# Patient Record
Sex: MALE | Race: WHITE | NOT HISPANIC OR LATINO | URBAN - METROPOLITAN AREA
[De-identification: names, ages, dates, MRNs, and addresses within clinical notes are randomized per-mention and may not be internally consistent; named-entity substitution may affect disease eponyms.]

---

## 2018-01-30 ENCOUNTER — APPOINTMENT (RX ONLY)
Dept: URBAN - METROPOLITAN AREA CLINIC 349 | Facility: CLINIC | Age: 28
Setting detail: DERMATOLOGY
End: 2018-01-30

## 2018-01-30 DIAGNOSIS — B36.0 PITYRIASIS VERSICOLOR: ICD-10-CM

## 2018-01-30 PROCEDURE — ? COUNSELING

## 2018-01-30 PROCEDURE — ? PRESCRIPTION

## 2018-01-30 PROCEDURE — 99202 OFFICE O/P NEW SF 15 MIN: CPT

## 2018-01-30 RX ORDER — KETOCONAZOLE 20.5 MG/ML
SHAMPOO, SUSPENSION TOPICAL
Qty: 1 | Refills: 5 | Status: ERX | COMMUNITY
Start: 2018-01-30

## 2018-01-30 RX ORDER — KETOCONAZOLE 200 MG/1
TABLET ORAL
Qty: 8 | Refills: 0 | Status: ERX | COMMUNITY
Start: 2018-01-30

## 2018-01-30 RX ADMIN — KETOCONAZOLE: 200 TABLET ORAL at 00:00

## 2018-01-30 RX ADMIN — KETOCONAZOLE: 20.5 SHAMPOO, SUSPENSION TOPICAL at 00:00

## 2018-01-30 ASSESSMENT — LOCATION ZONE DERM
LOCATION ZONE: TRUNK
LOCATION ZONE: ARM

## 2018-01-30 ASSESSMENT — LOCATION SIMPLE DESCRIPTION DERM
LOCATION SIMPLE: RIGHT UPPER ARM
LOCATION SIMPLE: RIGHT UPPER BACK
LOCATION SIMPLE: LEFT UPPER BACK
LOCATION SIMPLE: CHEST
LOCATION SIMPLE: LEFT FOREARM
LOCATION SIMPLE: LEFT UPPER ARM
LOCATION SIMPLE: RIGHT FOREARM

## 2018-01-30 ASSESSMENT — LOCATION DETAILED DESCRIPTION DERM
LOCATION DETAILED: LEFT SUPERIOR LATERAL UPPER BACK
LOCATION DETAILED: LEFT ANTERIOR PROXIMAL UPPER ARM
LOCATION DETAILED: RIGHT SUPERIOR UPPER BACK
LOCATION DETAILED: LEFT LATERAL UPPER BACK
LOCATION DETAILED: RIGHT VENTRAL PROXIMAL FOREARM
LOCATION DETAILED: LEFT MEDIAL SUPERIOR CHEST
LOCATION DETAILED: LEFT INFERIOR UPPER BACK
LOCATION DETAILED: LEFT VENTRAL PROXIMAL FOREARM
LOCATION DETAILED: RIGHT ANTERIOR PROXIMAL UPPER ARM

## 2022-11-16 ENCOUNTER — APPOINTMENT (RX ONLY)
Dept: URBAN - METROPOLITAN AREA CLINIC 330 | Facility: CLINIC | Age: 32
Setting detail: DERMATOLOGY
End: 2022-11-16

## 2022-11-16 DIAGNOSIS — Z80.8 FAMILY HISTORY OF MALIGNANT NEOPLASM OF OTHER ORGANS OR SYSTEMS: ICD-10-CM

## 2022-11-16 DIAGNOSIS — L82.1 OTHER SEBORRHEIC KERATOSIS: ICD-10-CM

## 2022-11-16 DIAGNOSIS — B36.0 PITYRIASIS VERSICOLOR: ICD-10-CM | Status: WELL CONTROLLED

## 2022-11-16 PROCEDURE — ? PRESCRIPTION MEDICATION MANAGEMENT

## 2022-11-16 PROCEDURE — ? COUNSELING

## 2022-11-16 PROCEDURE — 99203 OFFICE O/P NEW LOW 30 MIN: CPT

## 2022-11-16 PROCEDURE — ? PRESCRIPTION

## 2022-11-16 RX ORDER — KETOCONAZOLE 20 MG/G
CREAM TOPICAL
Qty: 60 | Refills: 4 | Status: ERX | COMMUNITY
Start: 2022-11-16

## 2022-11-16 RX ORDER — KETOCONAZOLE 20 MG/ML
SHAMPOO, SUSPENSION TOPICAL
Qty: 120 | Refills: 4 | Status: ERX | COMMUNITY
Start: 2022-11-16

## 2022-11-16 RX ADMIN — KETOCONAZOLE: 20 SHAMPOO, SUSPENSION TOPICAL at 00:00

## 2022-11-16 RX ADMIN — KETOCONAZOLE: 20 CREAM TOPICAL at 00:00

## 2022-11-16 ASSESSMENT — LOCATION ZONE DERM
LOCATION ZONE: ARM
LOCATION ZONE: TRUNK
LOCATION ZONE: HAND

## 2022-11-16 ASSESSMENT — LOCATION DETAILED DESCRIPTION DERM
LOCATION DETAILED: LEFT VENTRAL PROXIMAL FOREARM
LOCATION DETAILED: LEFT INFERIOR UPPER BACK
LOCATION DETAILED: RIGHT VENTRAL PROXIMAL FOREARM
LOCATION DETAILED: LEFT SUPERIOR LATERAL UPPER BACK
LOCATION DETAILED: LEFT ULNAR DORSAL HAND
LOCATION DETAILED: RIGHT RADIAL DORSAL HAND
LOCATION DETAILED: LEFT LATERAL UPPER BACK
LOCATION DETAILED: LEFT ANTERIOR PROXIMAL UPPER ARM
LOCATION DETAILED: LEFT MEDIAL SUPERIOR CHEST
LOCATION DETAILED: RIGHT SUPERIOR UPPER BACK
LOCATION DETAILED: RIGHT ANTERIOR PROXIMAL UPPER ARM

## 2022-11-16 ASSESSMENT — LOCATION SIMPLE DESCRIPTION DERM
LOCATION SIMPLE: LEFT UPPER BACK
LOCATION SIMPLE: RIGHT UPPER ARM
LOCATION SIMPLE: LEFT FOREARM
LOCATION SIMPLE: RIGHT UPPER BACK
LOCATION SIMPLE: CHEST
LOCATION SIMPLE: LEFT UPPER ARM
LOCATION SIMPLE: LEFT HAND
LOCATION SIMPLE: RIGHT FOREARM
LOCATION SIMPLE: RIGHT HAND

## 2022-11-16 NOTE — PROCEDURE: PRESCRIPTION MEDICATION MANAGEMENT
Initiate Treatment: ketoconazole 2 % shampoo, Wash affected area on body once daily for two weeks when flared, then once weekly for maintenance\\n\\nketoconazole 2 % topical cream, Apply to affected areas on body bid x2 weeks then once weekly for maintenance.
Detail Level: Zone
Render In Strict Bullet Format?: No

## 2024-10-01 ENCOUNTER — OFFICE VISIT (OUTPATIENT)
Dept: ORTHOPEDIC SURGERY | Age: 34
End: 2024-10-01
Payer: COMMERCIAL

## 2024-10-01 VITALS — HEIGHT: 78 IN | WEIGHT: 215 LBS | BODY MASS INDEX: 24.88 KG/M2

## 2024-10-01 DIAGNOSIS — M25.511 RIGHT SHOULDER PAIN, UNSPECIFIED CHRONICITY: Primary | ICD-10-CM

## 2024-10-01 DIAGNOSIS — S43.431A SUPERIOR GLENOID LABRUM LESION OF RIGHT SHOULDER, INITIAL ENCOUNTER: ICD-10-CM

## 2024-10-01 DIAGNOSIS — M75.21 BICEPS TENDINITIS OF RIGHT SHOULDER: ICD-10-CM

## 2024-10-01 PROCEDURE — G8484 FLU IMMUNIZE NO ADMIN: HCPCS | Performed by: ORTHOPAEDIC SURGERY

## 2024-10-01 PROCEDURE — G8419 CALC BMI OUT NRM PARAM NOF/U: HCPCS | Performed by: ORTHOPAEDIC SURGERY

## 2024-10-01 PROCEDURE — 99204 OFFICE O/P NEW MOD 45 MIN: CPT | Performed by: ORTHOPAEDIC SURGERY

## 2024-10-01 PROCEDURE — G8427 DOCREV CUR MEDS BY ELIG CLIN: HCPCS | Performed by: ORTHOPAEDIC SURGERY

## 2024-10-01 PROCEDURE — 1036F TOBACCO NON-USER: CPT | Performed by: ORTHOPAEDIC SURGERY

## 2024-10-01 RX ORDER — MELOXICAM 7.5 MG/1
TABLET ORAL
Qty: 42 TABLET | Refills: 0 | Status: SHIPPED | OUTPATIENT
Start: 2024-10-01

## 2024-10-01 NOTE — PROGRESS NOTES
Plan: I had a long discussion with the patient regarding the natural history of the problem, as well as treatment options. I discussed with the patient treatment options including oral medication, injections, advanced imaging, physical therapy and ultimately surgical intervention.  At this time, the patient has decided with me to proceed with NSAIDs and physical therapy.  Discussed the possibility for diagnostic and therapeutic injection but I think at this point I would suggest just trying the NSAIDs and therapy.  If not improving may consider MRI.      Treatment:   Recommend therapy to evaluate and treat current complaints and pathology. A home exercise program was also discussed with the patient.  Patient prescribed with Non-steroidal anti-inflammatories after review of risks and benefits. We discussed additional activity modification to help reduce pain for initial conservative treatment.      Return in about 6 weeks (around 11/12/2024).    Thank you for the opportunity to see your patient.  If you have any questions please give me a call.  Sincerely,    González Chase MD  10/01/24

## 2024-10-10 ENCOUNTER — EVALUATION (OUTPATIENT)
Age: 34
End: 2024-10-10
Payer: COMMERCIAL

## 2024-10-10 DIAGNOSIS — S43.431A SUPERIOR GLENOID LABRUM LESION OF RIGHT SHOULDER, INITIAL ENCOUNTER: ICD-10-CM

## 2024-10-10 DIAGNOSIS — Z74.09 IMPAIRED FUNCTIONAL MOBILITY AND ACTIVITY TOLERANCE: ICD-10-CM

## 2024-10-10 DIAGNOSIS — M62.81 MUSCLE WEAKNESS: ICD-10-CM

## 2024-10-10 DIAGNOSIS — G89.29 CHRONIC RIGHT SHOULDER PAIN: Primary | ICD-10-CM

## 2024-10-10 DIAGNOSIS — M25.511 CHRONIC RIGHT SHOULDER PAIN: Primary | ICD-10-CM

## 2024-10-10 DIAGNOSIS — M25.511 RIGHT SHOULDER PAIN, UNSPECIFIED CHRONICITY: ICD-10-CM

## 2024-10-10 DIAGNOSIS — M75.21 BICEPS TENDINITIS OF RIGHT SHOULDER: ICD-10-CM

## 2024-10-10 PROCEDURE — 97110 THERAPEUTIC EXERCISES: CPT | Performed by: PHYSICAL THERAPIST

## 2024-10-10 PROCEDURE — 97161 PT EVAL LOW COMPLEX 20 MIN: CPT | Performed by: PHYSICAL THERAPIST

## 2024-10-10 NOTE — PROGRESS NOTES
[facility]  [address] [place] [id number]  Dept: [phone]      Physical Therapy Initial Assessment     Referring MD: KAYLEE Chase MD  Diagnosis:     ICD-10-CM    1. Chronic right shoulder pain  M25.511     G89.29       2. Muscle weakness  M62.81       3. Impaired functional mobility and activity tolerance  Z74.09          Co-morbidities affecting plan of care: none  Payor: Payor: [facility] /  /  /  Billing pattern: Commercial- substantial/midpoint time each CPT   Timed Procedure Codes min: 25, Total Time: 45  min   Modifier needed: No  Visit count: 1     PERTINENT MEDICAL HISTORY     Past medical and surgical history:   No past medical history on file.   No past surgical history on file.  Medications: reviewed in chart   Allergies: No Known Allergies         SUBJECTIVE     Chief complaints/history of injury:    Date symptoms began: 7/424  Nature of condition: Chronic (continuous duration > 3 months)  Primary cause of current episode: Repetitive  How did symptoms start: Pt reports over July 4 weekend he was throwing nieces and nephews around in his pool and developed R shl pain.  His pain has since overall improved but w/ OH reaching w/ resistance he has pain and w/ reaching up/across such as combing his hair.  He also notes soreness while lying on his shoulder at night (worse lying on L side).  He also notes popping in front of shoulder as well.  He initially had some tingling but his has improved.  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  He stays active w/ basketball, running and bike riding.  He has not done much arm workouts d/t his shoulder injury  PMH: sx x 3 for knees: scope, cartilage repair L, cartilage repair R (upcoming R knee sx, not scheduled yet)      Received previous outpatient therapy? No    Pain Assessment:  Average Pain/symptom intensity (0-10 scale)  Last 24 hours: 0-2/10  Last week (1-7 days): 6/10  How often do you feel

## 2024-10-18 ENCOUNTER — TREATMENT (OUTPATIENT)
Age: 34
End: 2024-10-18

## 2024-10-18 DIAGNOSIS — M25.511 CHRONIC RIGHT SHOULDER PAIN: Primary | ICD-10-CM

## 2024-10-18 DIAGNOSIS — G89.29 CHRONIC RIGHT SHOULDER PAIN: Primary | ICD-10-CM

## 2024-10-18 DIAGNOSIS — M62.81 MUSCLE WEAKNESS: ICD-10-CM

## 2024-10-18 NOTE — PROGRESS NOTES
UE w/ OH reaching tasks    [employer]   Access Code: [email or web address]  URL: [email or web address]  Date: 10/10/2024  Prepared by: Mecca Felipe    Exercises  - Supine Chest Stretch with Elbows Bent  - 2 x daily - 8 reps - 10s hold  - Sidelying Thoracic Rotation with Open Book  - 2 x daily - 2 sets - 10 reps - 5s hold  - Sidelying Posterior Cuff Cross Body Stretch  - 2 x daily - 10 reps - 10s hold  - Sleeper Stretch  - 1 x daily - 8 reps - 10s hold  - Scaption with Dumbbells  - 4 x weekly - 3 sets - 10 reps  - Prone Single Arm Shoulder Horizontal Abduction with Dumbbell - Palm Down  - 4 x weekly - 3 sets - 10 reps - 3s/3s hold  - Sidelying Shoulder External Rotation with Dumbbell  - 4 x weekly - 3 sets - 10 reps - 3s/3s hold

## 2024-10-24 ENCOUNTER — TREATMENT (OUTPATIENT)
Age: 34
End: 2024-10-24
Payer: COMMERCIAL

## 2024-10-24 DIAGNOSIS — Z74.09 IMPAIRED FUNCTIONAL MOBILITY AND ACTIVITY TOLERANCE: ICD-10-CM

## 2024-10-24 DIAGNOSIS — G89.29 CHRONIC RIGHT SHOULDER PAIN: Primary | ICD-10-CM

## 2024-10-24 DIAGNOSIS — M25.511 RIGHT SHOULDER PAIN, UNSPECIFIED CHRONICITY: ICD-10-CM

## 2024-10-24 DIAGNOSIS — M25.511 CHRONIC RIGHT SHOULDER PAIN: Primary | ICD-10-CM

## 2024-10-24 DIAGNOSIS — M62.81 MUSCLE WEAKNESS: ICD-10-CM

## 2024-10-24 DIAGNOSIS — M75.21 BICEPS TENDINITIS OF RIGHT SHOULDER: ICD-10-CM

## 2024-10-24 PROCEDURE — M5044 MISC THERA-BAND/TUBING: HCPCS | Performed by: PHYSICAL THERAPIST

## 2024-10-24 PROCEDURE — 97110 THERAPEUTIC EXERCISES: CPT | Performed by: PHYSICAL THERAPIST

## 2024-10-24 NOTE — PROGRESS NOTES
[facility] INT [facility]  [address] [place] [id number]  Dept: [phone]      Physical Therapy Daily Note     Referring MD: KAYLEE Chase MD  Diagnosis:     ICD-10-CM    1. Chronic right shoulder pain  M25.511     G89.29       2. Muscle weakness  M62.81       3. Impaired functional mobility and activity tolerance  Z74.09       4. Biceps tendinitis of right shoulder [M75.21]  M75.21       5. Right shoulder pain, unspecified chronicity [M25.511]  M25.511          Co-morbidities affecting plan of care: none  Payor: Payor: [employer] /  /  /  Billing pattern: Commercial- substantial/midpoint time each CPT   Timed Procedure Codes min: 40, Total Time: 40  min   Modifier needed: No  Visit count: 3     Chief complaints/history of injury:    Date symptoms began: 7/4/24  Nature of condition: Chronic (continuous duration > 3 months)  Primary cause of current episode: Repetitive  How did symptoms start: Pt reports over July 4 weekend he was throwing nieces and nephews around in his pool and developed R shl pain.  His pain has since overall improved but w/ OH reaching w/ resistance he has pain and w/ reaching up/across such as combing his hair.  He also notes soreness while lying on his shoulder at night (worse lying on L side).  He also notes popping in front of shoulder as well.  He initially had some tingling but his has improved.  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  He stays active w/ basketball, running and bike riding.  He has not done much arm workouts d/t his shoulder injury  PMH: sx x 3 for knees: scope, cartilage repair L, cartilage repair R (upcoming R knee sx, not scheduled yet)    Patient Stated Goals: return OH strength and reduce popping w/ grooming and simple tasks    SUBJECTIVE     Pt reports that his shoulder is improving and popping has lessened but is still there.     Medications: no changes since last session    OBJECTIVE      Functional Outcome

## 2024-10-31 ENCOUNTER — TREATMENT (OUTPATIENT)
Age: 34
End: 2024-10-31

## 2024-10-31 DIAGNOSIS — M62.81 MUSCLE WEAKNESS: ICD-10-CM

## 2024-10-31 DIAGNOSIS — G89.29 CHRONIC RIGHT SHOULDER PAIN: Primary | ICD-10-CM

## 2024-10-31 DIAGNOSIS — M25.511 CHRONIC RIGHT SHOULDER PAIN: Primary | ICD-10-CM

## 2024-10-31 NOTE — PROGRESS NOTES
[facility] - [facility]  [address] [place] [id number]  Dept: [phone]      Physical Therapy Daily Note     Referring MD: KAYLEE Chase MD  Diagnosis:     ICD-10-CM    1. Chronic right shoulder pain  M25.511     G89.29       2. Muscle weakness  M62.81          Co-morbidities affecting plan of care: none  Payor: Payor: [facility] /  /  /  Billing pattern: Commercial- substantial/midpoint time each CPT   Timed Procedure Codes min: 40, Total Time: 40  min   Modifier needed: No  Visit count: 4     Chief complaints/history of injury:    Date symptoms began: 7/4/24  Nature of condition: Chronic (continuous duration > 3 months)  Primary cause of current episode: Repetitive  How did symptoms start: Pt reports over July 4 weekend he was throwing nieces and nephews around in his pool and developed R shl pain.  His pain has since overall improved but w/ OH reaching w/ resistance he has pain and w/ reaching up/across such as combing his hair.  He also notes soreness while lying on his shoulder at night (worse lying on L side).  He also notes popping in front of shoulder as well.  He initially had some tingling but his has improved.  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  He stays active w/ basketball, running and bike riding.  He has not done much arm workouts d/t his shoulder injury  PMH: sx x 3 for knees: scope, cartilage repair L, cartilage repair R (upcoming R knee sx, not scheduled yet)    Patient Stated Goals: return OH strength and reduce popping w/ grooming and simple tasks    SUBJECTIVE     Pt reports general tenderness in shoulder but has noticed less pain w/ reaching OH    Medications: no changes since last session    OBJECTIVE      Functional Outcome Questionnaire: QuickDASH:  27/100= 27% functional deficit     A/PROM Measures:   Shoulder Right Left Comment   Flexion 170     Scaption 170     IR at 45° abd 65     ER at 90° abd 70     ER at 0° abd      Elbow

## 2024-11-08 ENCOUNTER — TREATMENT (OUTPATIENT)
Age: 34
End: 2024-11-08

## 2024-11-08 DIAGNOSIS — M25.511 CHRONIC RIGHT SHOULDER PAIN: Primary | ICD-10-CM

## 2024-11-08 DIAGNOSIS — M62.81 MUSCLE WEAKNESS: ICD-10-CM

## 2024-11-08 DIAGNOSIS — G89.29 CHRONIC RIGHT SHOULDER PAIN: Primary | ICD-10-CM

## 2024-11-08 NOTE — PROGRESS NOTES
[facility] PT INT - [facility] [facility]  [address] [facility] [id number]  Dept: [phone]      Physical Therapy Discharge     Referring MD: KAYLEE Chase MD  Diagnosis:     ICD-10-CM    1. Chronic right shoulder pain  M25.511     G89.29       2. Muscle weakness  M62.81          Co-morbidities affecting plan of care: none  Payor: Payor: [facility] /  /  /  Billing pattern: Commercial- substantial/midpoint time each CPT   Timed Procedure Codes min: 30, Total Time: 30  min   Modifier needed: No  Visit count: 5     Chief complaints/history of injury:    Date symptoms began: 7/4/24  Nature of condition: Chronic (continuous duration > 3 months)  Primary cause of current episode: Repetitive  How did symptoms start: Pt reports over July 4 weekend he was throwing nieces and nephews around in his pool and developed R shl pain.  His pain has since overall improved but w/ OH reaching w/ resistance he has pain and w/ reaching up/across such as combing his hair.  He also notes soreness while lying on his shoulder at night (worse lying on L side).  He also notes popping in front of shoulder as well.  He initially had some tingling but his has improved.  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  He stays active w/ basketball, running and bike riding.  He has not done much arm workouts d/t his shoulder injury  PMH: sx x 3 for knees: scope, cartilage repair L, cartilage repair R (upcoming R knee sx, not scheduled yet)    Patient Stated Goals: return OH strength and reduce popping w/ grooming and simple tasks    SUBJECTIVE     Pt reports he has had no pain in his shoulder and feels stronger w/ OH lifting.  He still has some popping when moving his hand across his body but this doesn't create pain    Medications: no changes since last session    OBJECTIVE      Functional Outcome Questionnaire: QuickDASH:  4.5/100= 5% functional deficit     A/PROM Measures:   Shoulder Right Left Comment   Flexion

## 2024-11-19 ENCOUNTER — OFFICE VISIT (OUTPATIENT)
Dept: ORTHOPEDIC SURGERY | Age: 34
End: 2024-11-19
Payer: COMMERCIAL

## 2024-11-19 DIAGNOSIS — S43.431A SUPERIOR GLENOID LABRUM LESION OF RIGHT SHOULDER, INITIAL ENCOUNTER: Primary | ICD-10-CM

## 2024-11-19 DIAGNOSIS — M75.21 BICEPS TENDINITIS OF RIGHT SHOULDER: ICD-10-CM

## 2024-11-19 PROCEDURE — 1036F TOBACCO NON-USER: CPT | Performed by: ORTHOPAEDIC SURGERY

## 2024-11-19 PROCEDURE — G8484 FLU IMMUNIZE NO ADMIN: HCPCS | Performed by: ORTHOPAEDIC SURGERY

## 2024-11-19 PROCEDURE — G8428 CUR MEDS NOT DOCUMENT: HCPCS | Performed by: ORTHOPAEDIC SURGERY

## 2024-11-19 PROCEDURE — G8419 CALC BMI OUT NRM PARAM NOF/U: HCPCS | Performed by: ORTHOPAEDIC SURGERY

## 2024-11-19 PROCEDURE — 99213 OFFICE O/P EST LOW 20 MIN: CPT | Performed by: ORTHOPAEDIC SURGERY

## 2024-11-19 NOTE — PROGRESS NOTES
Name: Costa Ramirez  YOB: 1990  Gender: male  MRN: [id number]    CC:   Chief Complaint   Patient presents with    Follow-up     Recheck R Shoulder DOI 7/4/24    Right shoulder pain    HPI:  This patient presents for a follow-up evaluation of the right shoulder.  The patient's last visit we prescribed physical therapy which she has been doing with Mecca.  We discussed potential MRI if symptoms persisted.  Recall:  Patient injured his shoulder on July 4 when he was throwing his kid and friend in the pool.  He states that he began having some anterior shoulder pain.  Notes some feelings of weakness and popping along the anterior aspect of the shoulder.  Denies numbness and tingling.  He is right-hand dominant.  Notes pain with throwing motion.  Denies prior issues before this event    No Known Allergies  No past medical history on file.  No past surgical history on file.  No family history on file.  Social History     Socioeconomic History    Marital status:      Spouse name: Not on file    Number of children: Not on file    Years of education: Not on file    Highest education level: Not on file   Occupational History    Not on file   Tobacco Use    Smoking status: Never    Smokeless tobacco: Never   Substance and Sexual Activity    Alcohol use: Not on file    Drug use: Not on file    Sexual activity: Not on file   Other Topics Concern    Not on file   Social History Narrative    Not on file     Social Determinants of Health     Financial Resource Strain: Not on file   Food Insecurity: Not on file   Transportation Needs: Not on file   Physical Activity: Not on file   Stress: Not on file   Social Connections: Unknown (3/19/2021)    Received from August    Social Connections     Frequency of Communication with Friends and Family: Not asked     Frequency of Social Gatherings with Friends and Family: Not asked   Intimate Partner Violence: Unknown (3/19/2021)    Received from [facility]